# Patient Record
Sex: FEMALE | Race: WHITE | URBAN - METROPOLITAN AREA
[De-identification: names, ages, dates, MRNs, and addresses within clinical notes are randomized per-mention and may not be internally consistent; named-entity substitution may affect disease eponyms.]

---

## 2019-05-20 ENCOUNTER — OFFICE VISIT (OUTPATIENT)
Dept: URBAN - METROPOLITAN AREA CLINIC 16 | Facility: CLINIC | Age: 79
End: 2019-05-20
Payer: MEDICARE

## 2019-05-20 DIAGNOSIS — H52.03 HYPERMETROPIA, BILATERAL: ICD-10-CM

## 2019-05-20 DIAGNOSIS — Z96.1 PRESENCE OF PSEUDOPHAKIA: ICD-10-CM

## 2019-05-20 PROCEDURE — 92014 COMPRE OPH EXAM EST PT 1/>: CPT | Performed by: OPTOMETRIST

## 2019-05-20 ASSESSMENT — VISUAL ACUITY
OD: 20/20
OS: 20/30

## 2019-05-20 ASSESSMENT — INTRAOCULAR PRESSURE
OD: 13
OS: 13

## 2019-05-20 NOTE — IMPRESSION/PLAN
Impression: Nonexudative age-related macular degeneration, bilateral, early dry stage: H35.3131. Plan: Discussed diagnosis in detail with patient. No treatment is required at this time. Use of vitamins has shown to improve the effects of ARMD. Recommend AREDS 2 formula. Wear quality sunglasses and monitor vision at home. Call the office for an immediate appointment if [address] worsens.

## 2020-08-19 ENCOUNTER — OFFICE VISIT (OUTPATIENT)
Dept: URBAN - METROPOLITAN AREA CLINIC 16 | Facility: CLINIC | Age: 80
End: 2020-08-19
Payer: MEDICARE

## 2020-08-19 PROCEDURE — 92014 COMPRE OPH EXAM EST PT 1/>: CPT | Performed by: OPTOMETRIST

## 2020-08-19 ASSESSMENT — INTRAOCULAR PRESSURE
OD: 15
OS: 15

## 2020-08-19 NOTE — IMPRESSION/PLAN
Impression: Nexdtve age-related mclr degn, bilateral, early dry stage: H35.3131. Plan: Discussed diagnosis in detail with patient. No treatment is required at this time. Use of vitamins has shown to improve the effects of ARMD. Recommend AREDS 2 formula. Wear quality sunglasses and monitor vision at home. Call the office for an immediate appointment if [address] worsens.

## 2021-08-31 ENCOUNTER — OFFICE VISIT (OUTPATIENT)
Dept: URBAN - METROPOLITAN AREA CLINIC 16 | Facility: CLINIC | Age: 81
End: 2021-08-31
Payer: MEDICARE

## 2021-08-31 DIAGNOSIS — H04.123 DRY EYE SYNDROME OF BILATERAL LACRIMAL GLANDS: ICD-10-CM

## 2021-08-31 DIAGNOSIS — H35.3131 NONEXUDATIVE AGE-RELATED MACULAR DEGENERATION, BILATERAL, EARLY DRY STAGE: Primary | ICD-10-CM

## 2021-08-31 PROCEDURE — 99213 OFFICE O/P EST LOW 20 MIN: CPT | Performed by: OPTOMETRIST

## 2021-08-31 ASSESSMENT — INTRAOCULAR PRESSURE
OS: 12
OD: 12

## 2021-08-31 NOTE — IMPRESSION/PLAN
Impression: Nonexudative age-related macular degeneration, bilateral, early dry stage: H35.3131. Plan: Pt ed re: condition, advised to use [employer] grid for home monitoring of vision changes. Continue AREDS vitamins at this time. 
RTC 1 year x dilated exam.

## 2021-08-31 NOTE — IMPRESSION/PLAN
Impression: Presence of pseudophakia: Z96.1. Plan: Condition is stable, no further treatment at this time. Monitor.

## 2022-04-27 ENCOUNTER — OFFICE VISIT (OUTPATIENT)
Dept: URBAN - METROPOLITAN AREA CLINIC 16 | Facility: CLINIC | Age: 82
End: 2022-04-27
Payer: MEDICARE

## 2022-04-27 DIAGNOSIS — H52.03 HYPERMETROPIA, BILATERAL: Primary | ICD-10-CM

## 2022-04-27 DIAGNOSIS — H52.4 PRESBYOPIA: ICD-10-CM

## 2022-04-27 ASSESSMENT — VISUAL ACUITY
OS: 20/40
OD: 20/20